# Patient Record
Sex: FEMALE | Race: WHITE | NOT HISPANIC OR LATINO | URBAN - METROPOLITAN AREA
[De-identification: names, ages, dates, MRNs, and addresses within clinical notes are randomized per-mention and may not be internally consistent; named-entity substitution may affect disease eponyms.]

---

## 2022-07-09 ENCOUNTER — TELEPHONE ENCOUNTER (OUTPATIENT)
Dept: URBAN - METROPOLITAN AREA CLINIC 121 | Facility: CLINIC | Age: 62
End: 2022-07-09

## 2022-07-09 RX ORDER — PANTOPRAZOLE SODIUM 40 MG/1
GRANULE, DELAYED RELEASE ORAL AS DIRECTED
Refills: 3 | OUTPATIENT
Start: 2012-02-14 | End: 2012-07-31

## 2022-07-09 RX ORDER — DEXLANSOPRAZOLE 60 MG/1
CAPSULE, DELAYED RELEASE ORAL ONCE A DAY
Refills: 3 | OUTPATIENT
Start: 2012-02-16 | End: 2012-07-31

## 2022-07-10 ENCOUNTER — TELEPHONE ENCOUNTER (OUTPATIENT)
Dept: URBAN - METROPOLITAN AREA CLINIC 121 | Facility: CLINIC | Age: 62
End: 2022-07-10

## 2022-07-10 RX ORDER — ESTRADIOL 0.03 MG/D
PATCH TRANSDERMAL
Refills: 0 | Status: ACTIVE | COMMUNITY
Start: 2012-01-20

## 2022-07-10 RX ORDER — PREDNISONE 10 MG/1
TABLET ORAL
Refills: 0 | Status: ACTIVE | COMMUNITY
Start: 2012-02-16

## 2022-07-10 RX ORDER — CITALOPRAM 40 MG/1
TABLET, FILM COATED ORAL
Refills: 0 | Status: ACTIVE | COMMUNITY
Start: 2012-01-20

## 2022-07-10 RX ORDER — TRAMADOL HYDROCHLORIDE 50 MG/1
TABLET ORAL
Refills: 0 | Status: ACTIVE | COMMUNITY
Start: 2012-01-20

## 2022-10-27 ENCOUNTER — OFFICE (OUTPATIENT)
Dept: URBAN - METROPOLITAN AREA CLINIC 19 | Facility: CLINIC | Age: 62
End: 2022-10-27

## 2022-10-27 DIAGNOSIS — K21.9 GASTRO-ESOPHAGEAL REFLUX DISEASE WITHOUT ESOPHAGITIS: ICD-10-CM

## 2022-10-27 DIAGNOSIS — Z83.71 FAMILY HISTORY OF COLONIC POLYPS: ICD-10-CM

## 2022-10-27 PROCEDURE — 99204 OFFICE O/P NEW MOD 45 MIN: CPT

## 2022-10-27 RX ORDER — PANTOPRAZOLE SODIUM 40 MG/1
40 TABLET, DELAYED RELEASE ORAL
Qty: 30 | Refills: 11 | Status: ACTIVE
Start: 2022-10-27

## 2022-10-27 NOTE — SERVICEHPINOTES
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  She has had chronically flux for years and has been seen in the past by Dr. Gonzalez.  She apparently has had an EGD and colonoscopy, but this is been over 10 years ago.  She had been on Protonix for around 20 years before her PCP 3 years ago advised her to discontinue this out of concern for long-term side effects.  She was switched to Pepcid, she thinks, but admits this did not work as well.  She currently is not taking any antacid therapy, but has taken a few Tums recently.  Over the last 3 weeks her heartburn reflux has worsened again.  She does admittedly smoke marijuana.  She denies dysphagia, nausea, vomiting, abdominal pain or overt GI bleeding.  She does have mild chronic constipation which she attributes to not getting enough fiber in her diet.  Her mother had colon polyps around age 65.

## 2022-10-27 NOTE — SERVICENOTES
The patient's assessment was reviewed with Dr. Perez and a collaborative plan of care was established.

## 2023-02-20 VITALS — OXYGEN SATURATION: 99 % | HEIGHT: 66 IN | HEART RATE: 71 BPM

## 2023-02-22 ENCOUNTER — AMBULATORY SURGICAL CENTER (OUTPATIENT)
Dept: URBAN - METROPOLITAN AREA SURGERY 2 | Facility: SURGERY | Age: 63
End: 2023-02-22
Payer: COMMERCIAL

## 2023-02-22 VITALS
HEART RATE: 55 BPM | HEART RATE: 67 BPM | SYSTOLIC BLOOD PRESSURE: 81 MMHG | DIASTOLIC BLOOD PRESSURE: 75 MMHG | HEART RATE: 67 BPM | OXYGEN SATURATION: 96 % | WEIGHT: 147 LBS | HEART RATE: 65 BPM | OXYGEN SATURATION: 94 % | RESPIRATION RATE: 14 BRPM | OXYGEN SATURATION: 95 % | DIASTOLIC BLOOD PRESSURE: 59 MMHG | HEART RATE: 55 BPM | TEMPERATURE: 98.5 F | SYSTOLIC BLOOD PRESSURE: 81 MMHG | OXYGEN SATURATION: 96 % | SYSTOLIC BLOOD PRESSURE: 117 MMHG | TEMPERATURE: 98.2 F | RESPIRATION RATE: 14 BRPM | SYSTOLIC BLOOD PRESSURE: 95 MMHG | DIASTOLIC BLOOD PRESSURE: 43 MMHG | HEART RATE: 64 BPM | SYSTOLIC BLOOD PRESSURE: 117 MMHG | RESPIRATION RATE: 18 BRPM | OXYGEN SATURATION: 95 % | DIASTOLIC BLOOD PRESSURE: 51 MMHG | TEMPERATURE: 98.5 F | DIASTOLIC BLOOD PRESSURE: 43 MMHG | HEART RATE: 65 BPM | SYSTOLIC BLOOD PRESSURE: 123 MMHG | SYSTOLIC BLOOD PRESSURE: 74 MMHG | OXYGEN SATURATION: 94 % | RESPIRATION RATE: 16 BRPM | RESPIRATION RATE: 18 BRPM | HEIGHT: 66 IN | DIASTOLIC BLOOD PRESSURE: 59 MMHG | WEIGHT: 147 LBS | DIASTOLIC BLOOD PRESSURE: 51 MMHG | SYSTOLIC BLOOD PRESSURE: 123 MMHG | HEIGHT: 66 IN | HEART RATE: 64 BPM | TEMPERATURE: 98.2 F | DIASTOLIC BLOOD PRESSURE: 69 MMHG | DIASTOLIC BLOOD PRESSURE: 75 MMHG | SYSTOLIC BLOOD PRESSURE: 74 MMHG | DIASTOLIC BLOOD PRESSURE: 69 MMHG | RESPIRATION RATE: 16 BRPM | SYSTOLIC BLOOD PRESSURE: 95 MMHG

## 2023-02-22 DIAGNOSIS — Z12.11 ENCOUNTER FOR SCREENING FOR MALIGNANT NEOPLASM OF COLON: ICD-10-CM

## 2023-02-22 DIAGNOSIS — Z83.71 FAMILY HISTORY OF COLONIC POLYPS: ICD-10-CM

## 2023-02-22 DIAGNOSIS — K21.9 GASTRO-ESOPHAGEAL REFLUX DISEASE WITHOUT ESOPHAGITIS: ICD-10-CM

## 2023-02-22 PROCEDURE — 45378 DIAGNOSTIC COLONOSCOPY: CPT | Performed by: INTERNAL MEDICINE

## 2023-02-22 PROCEDURE — G0105 COLORECTAL SCRN; HI RISK IND: HCPCS | Performed by: INTERNAL MEDICINE

## 2023-02-22 PROCEDURE — 43235 EGD DIAGNOSTIC BRUSH WASH: CPT | Mod: 51 | Performed by: INTERNAL MEDICINE

## 2023-04-26 ENCOUNTER — OFFICE (OUTPATIENT)
Dept: URBAN - METROPOLITAN AREA CLINIC 19 | Facility: CLINIC | Age: 63
End: 2023-04-26
Payer: COMMERCIAL

## 2023-04-26 VITALS
HEIGHT: 66 IN | SYSTOLIC BLOOD PRESSURE: 126 MMHG | DIASTOLIC BLOOD PRESSURE: 83 MMHG | OXYGEN SATURATION: 97 % | DIASTOLIC BLOOD PRESSURE: 83 MMHG | SYSTOLIC BLOOD PRESSURE: 126 MMHG | DIASTOLIC BLOOD PRESSURE: 83 MMHG | SYSTOLIC BLOOD PRESSURE: 126 MMHG | HEIGHT: 66 IN | HEART RATE: 59 BPM | OXYGEN SATURATION: 97 % | HEIGHT: 66 IN | WEIGHT: 139 LBS | WEIGHT: 139 LBS | HEART RATE: 59 BPM | HEART RATE: 59 BPM | WEIGHT: 139 LBS | OXYGEN SATURATION: 97 %

## 2023-04-26 DIAGNOSIS — R10.11 RIGHT UPPER QUADRANT PAIN: ICD-10-CM

## 2023-04-26 DIAGNOSIS — K21.9 GASTRO-ESOPHAGEAL REFLUX DISEASE WITHOUT ESOPHAGITIS: ICD-10-CM

## 2023-04-26 DIAGNOSIS — K80.20 CALCULUS OF GALLBLADDER WITHOUT CHOLECYSTITIS WITHOUT OBSTRU: ICD-10-CM

## 2023-04-26 PROCEDURE — 99204 OFFICE O/P NEW MOD 45 MIN: CPT

## 2023-04-26 PROCEDURE — 99213 OFFICE O/P EST LOW 20 MIN: CPT

## 2023-04-26 NOTE — SERVICEHPINOTES
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
michelle martinez   I initially saw her 10/27/22 for complaints of heartburn reflux and discuss a colonoscopy.  I started her on pantoprazole 40 mg/d.  EGD/colonoscopy 2/22/23 was normal.
michelle martinez   Since these procedures she developed acute intermittent RUQ pain especially after eating.  She had recently gone on vacation with friends and admittedly ate and drank foods and drinks she does not normally eat and drink.  She initially was seen by her PCP and routine blood work 4/2/23 was unremarkable and gallbladder ultrasound negative.  She had a CT abdomen/ pelvis 4/3/23 that was also unremarkable.  After this she had several subsequent acute "attacks".  Her most significant attack was 4/18/23 and she went to the ED at [facility] where she ended up having an MRCP which found a 1.3 centimeter gallstone.  There was no CBD stone or biliary dilatation.  She was discharged encouraged to follow-up with GI.  She has tried be cautious with her diet and has limited the amount of fat in her diet.  Her reflux is well managed on pantoprazole 40 mg/d.  She denies dysphagia, nausea, vomiting or overt GI bleeding.  She does have mild chronic constipation which she attributes to not getting enough fiber in her diet.  michelle martinez  Her mother had colon polyps around age 65.

## 2023-04-26 NOTE — SERVICEHPINOTES
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
michelle martinez   I initially saw her 10/27/22 for complaints of heartburn reflux and discuss a colonoscopy.  I started her on pantoprazole 40 mg/d.  EGD/colonoscopy 2/22/23 was normal.
michelle martinez   Since these procedures she developed acute intermittent RUQ pain especially after eating.  She had recently gone on vacation with friends and admittedly ate and drank foods and drinks she does not normally eat and drink.  She initially was seen by her PCP and routine blood work 4/2/23 was unremarkable and gallbladder ultrasound negative.  She had a CT abdomen/ pelvis 4/3/23 that was also unremarkable.  After this she had several subsequent acute "attacks".  Her most significant attack was 4/18/23 and she went to the ED at [facility] where she ended up having an MRCP which found a 1.3 centimeter gallstone.  There was no CBD stone or biliary dilatation.  She was discharged encouraged to follow-up with GI.  She has tried be cautious with her diet and has limited the amount of fat in her diet.  Her reflux is well managed on pantoprazole 40 mg/d.  She denies dysphagia, nausea, vomiting or overt GI bleeding.  She does have mild chronic constipation which she attributes to not getting enough fiber in her diet.  michelle martinez  Her mother had colon polyps around age 65.

## 2023-04-26 NOTE — SERVICEHPINOTES
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
michelle martinez   I initially saw her 10/27/22 for complaints of heartburn reflux and discuss a colonoscopy.  I started her on pantoprazole 40 mg/d.  EGD/colonoscopy 2/22/23 was normal.
michelle martinez   Since these procedures she developed acute intermittent RUQ pain especially after eating.  She had recently gone on vacation with friends and admittedly ate and drank foods and drinks she does not normally eat and drink.  She initially was seen by her PCP and routine blood work 4/2/23 was unremarkable and gallbladder ultrasound negative.  She had a CT abdomen/ pelvis 4/3/23 that was also unremarkable.  After this she had several subsequent acute "attacks".  Her most significant attack was 4/18/23 and she went to the ED at [facility] where she ended up having an MRCP which found a 1.3 centimeter gallstone.  There was no CBD stone or biliary dilatation.  She was discharged encouraged to follow-up with GI.  She has tried be cautious with her diet and has limited the amount of fat in her diet.  Her reflux is well managed on pantoprazole 40 mg/d.  She denies dysphagia, nausea, vomiting or overt GI bleeding.  She does have mild chronic constipation which she attributes to not getting enough fiber in her diet.  michelle martinez  Her mother had colon polyps around age 65.